# Patient Record
Sex: MALE | Race: WHITE | Employment: OTHER | URBAN - METROPOLITAN AREA
[De-identification: names, ages, dates, MRNs, and addresses within clinical notes are randomized per-mention and may not be internally consistent; named-entity substitution may affect disease eponyms.]

---

## 2017-04-27 ENCOUNTER — HOSPITAL ENCOUNTER (OUTPATIENT)
Dept: PHYSICAL THERAPY | Age: 82
Discharge: HOME | End: 2017-04-27
Payer: MEDICARE

## 2017-04-27 PROCEDURE — G8978 MOBILITY CURRENT STATUS: HCPCS

## 2017-04-27 PROCEDURE — 97162 PT EVAL MOD COMPLEX 30 MIN: CPT

## 2017-04-27 PROCEDURE — G8979 MOBILITY GOAL STATUS: HCPCS

## 2017-04-27 NOTE — PROGRESS NOTES
PHYSICAL THERAPY - DAILY TREATMENT NOTE    Patient Name: Tamera Anthony        Date: 2017  : 1933   YES Patient  Verified  Visit #:     Insurance: Payor: [place] MEDICARE / Plan: [place] MEDICARE PART A & B / Product Type: Medicare /      In time: 11:35 Out time: 12:15   Total Treatment Time: 40     Medicare Time Tracking (below)   Total Timed Codes (min):  - 1:1 Treatment Time:  40     TREATMENT AREA =  Gait    SUBJECTIVE    Pain Level (on 0 to 10 scale):  0  / 10   Medication Changes/New allergies or changes in medical history, any new surgeries or procedures? NO    If yes, update Summary List   Subjective Functional Status/Changes:  []  No changes reported     See eval          OBJECTIVE    Modality rationale:  -    min [] Estim, type:                                          []  att     []  unatt     []  w/US     []  w/ice    []  w/heat    min []  Mechanical Traction: type/lbs                                               []  pro   []  sup   []  int   []  cont    min []  Ultrasound, settings/location:      min []  Iontophoresis:  []  take home patch w/ dexamethazone    min                                []  in clinic w/ dexamethazone    min []  Ice     []  Heat     position:     min []  Other:      - min Therapeutic Exercise:  [x]  See flow sheet   []  Other:      []  Added:     to improve (function):    []  Changed:     to improve (function):       min Therapeutic Activity:      - min Manual Therapy:       min Gait Training:       min Patient Education:  YES  Reviewed HEP   []  Progressed/Changed HEP based on:         Other Objective/Functional Measures:    See eval     Post Treatment Pain Level (on 0 to 10) scale:   0  / 10     ASSESSMENT    []  See Progress Note/Recertification   Patient will continue to benefit from skilled therapy to address remaining functional deficits: see eval   Progress toward goals / Updated goals:    -     PLAN    [x]  Upgrade activities as tolerated YES Continue plan of care   []  Discharge due to :    []  Other:      Therapist: Noah Ballesteros PT    Date: 4/27/2017 Time: 11:31 AM

## 2017-04-27 NOTE — PROGRESS NOTES
[facility] PHYSICAL THERAPY AT [address], [address] [place] [address], [address] [facility], [address] [place] [address] Ln - Phone: [phone]  Fax: [phone] / [address] [facility]  Patient Name: Manny Barker : 1933   Medical   Diagnosis: Other abnormalities of gait and mobility [R26.89] Treatment Diagnosis: Ataxia   Onset Date: chronic     Referral Source: Krissy Wiggins MD Start of Care [facility]): 2017   Prior Hospitalization: See medical history Provider #: [id number]   Prior Level of Function: Chronic decreased gait   Comorbidities: HBP   Medications: Verified on Patient Summary List   The Plan of Care and following information is based on the information from the initial evaluation.   ==============================================================================  Assessment / key information:   Manny Barker is a 80 y.o. male with a chief c/o of recent falls. He reports 2-3 falls this year. He is ambulating without an assistive device. He c/o SOB, which he claims is responsible for his recent falls. He was able to do 15 minutes on the stationary bike, at a high RPM, for 15 minutes before becoming SOB. This makes me worry about side affects of hs meds or positional hypotension as a possible cause of his fainting, not SOB. He scored 43/56 on the Spencer balance test and 17/24 on the Dynamic Gait Index (DGI). Both scores indicate an increased risk of falls. His LE MMT was normal, except for R ankle DF (4+/5), with recent R foot surgery.   The patient would benefit from skilled physical therapy at this time.  ===========================================================================================  Eval Complexity: History MEDIUM  Complexity : 1-2 comorbidities / personal factors will impact the outcome/ POC ;  Examination  HIGH Complexity : 4+ Standardized tests and measures addressing body structure, function, activity limitation and / or participation in recreation ; Presentation MEDIUM Complexity : Evolving with changing characteristics ; Decision Making MEDIUM Complexity : FOTO score of 26-74; Overall Complexity MEDIUM  Problem List: pain affecting function, decrease ROM, decrease strength, impaired gait/ balance, decrease ADL/ functional abilitiies, decrease activity tolerance and decrease flexibility/ joint mobility   Treatment Plan may include any combination of the following: Therapeutic exercise, Therapeutic activities, Neuromuscular re-education, Physical agent/modality, Gait/balance training, Manual therapy and Patient education  Patient / Family readiness to learn indicated by: asking questions, trying to perform skills and interest  Persons(s) to be included in education: patient (P)  Barriers to Learning/Limitations: None  Measures taken:    Patient Goal (s): \"improve balance and walking better\"   Patient self reported health status: good  Rehabilitation Potential: good   Short Term Goals: To be accomplished in  2  weeks:  1. Pt compliant with HEP   Long Term Goals: To be accomplished in  4-5  weeks:  1. Increase Spencer Balance score to > 45/56, to decrease risk of falls  2. Increase DGI score to > 18/24, to decrease risk of falls  3. Increase strength and stability,as indicated by recipricol gait on stairs without the need for handrails  4. Patient Independent with HEP/selfcare  Frequency / Duration:   Patient to be seen 2-3  times per week for 4-8  weeks:  Patient / Caregiver education and instruction: self care, activity modification and exercises  G-Codes (GP): Mobility [id number] Current  CJ= 20-39%   Goal  CI= 1-19%. The severity rating is based on the [address] score  Therapist Signature:  Troy Tate PT, MDT Date: 4/12/2401   Certification Period: 4/27/17-7/23/17 Time: 12:09 PM   ===========================================================================================  I certify that the above Physical Therapy Services are being furnished while the patient is under my care. I agree with the treatment plan and certify that this therapy is necessary. Physician Signature:        Date:       Time:     Please sign and return to [facility] or you may fax the signed copy to [phone]. Thank you.

## 2017-05-01 ENCOUNTER — HOSPITAL ENCOUNTER (OUTPATIENT)
Dept: PHYSICAL THERAPY | Age: 82
Discharge: HOME | End: 2017-05-01
Payer: MEDICARE

## 2017-05-01 PROCEDURE — 97112 NEUROMUSCULAR REEDUCATION: CPT

## 2017-05-01 PROCEDURE — 97110 THERAPEUTIC EXERCISES: CPT

## 2017-05-01 NOTE — PROGRESS NOTES
PHYSICAL THERAPY - DAILY TREATMENT NOTE    Patient Name: Jw Liao        Date: 2017  : 1933   YES Patient  Verified  Visit #:      of   12  Insurance: Payor: Janyefrain Patricia / Plan: VA MEDICARE PART A & B / Product Type: Medicare /      In time: 400 Out time: 440   Total Treatment Time: 40     Medicare Time Tracking (below)   Total Timed Codes (min):  40 1:1 Treatment Time:  40     TREATMENT AREA = Other abnormalities of gait and mobility [R26.89]  Ataxia [R27.0]    SUBJECTIVE  Pain Level (on 0 to 10 scale):  0  / 10   Medication Changes/New allergies or changes in medical history, any new surgeries or procedures? NO    If yes, update Summary List   Subjective Functional Status/Changes:  []  No changes reported     SOB limits my activity        OBJECTIVE    10 min Therapeutic Exercise:  [x]  See flow sheet   Rationale:      increase strength to improve the patients ability to a  Transition with increased ease         30 min Neuromuscular Re-ed: [x]  See flow sheet   Rationale:    improve coordination, improve balance and increase proprioception to improve the patients ability to amb safely         min Gait Training:    Rationale:        throughout Rx min Patient Education:  YES  Reviewed HEP   []  Progressed/Changed HEP based on: Other Objective/Functional Measures:    Initiated HEP. Provided written instruct for HEP     Post Treatment Pain Level (on 0 to 10) scale:   0  / 10     ASSESSMENT  Assessment/Changes in Function:     Functional improvement:  Initiated HEP   Functional limitation:  Fall risk      []  See Progress Note/Recertification   Patient will continue to benefit from skilled PT services to modify and progress therapeutic interventions, address functional mobility deficits, address strength deficits and address imbalance/dizziness to attain remaining goals.    Progress toward goals / Updated goals:    Initiated HEP     PLAN  [x]  Upgrade activities as tolerated YES Continue plan of care   []  Discharge due to :    []  Other:      Therapist: Kori Johnson PT    Date: 5/1/2017 Time: 5:30 PM     Future Appointments  Date Time Provider Steve Harrison   5/4/2017 12:00 PM Otilia Liao, PT REHAB CENTER AT [facility]   5/8/2017 11:30 AM Kori Johnson, PT REHAB [facility]ENTER AT [facility]   5/11/2017 12:00 PM Kori Johnson, PT REHAB CENTER AT [facility]   5/15/2017 11:30 AM Kori Johnson, PT REHAB CENTER AT [facility]   5/17/2017 12:30 PM Otilia Liao, PT REHAB [facility]ENTER AT [facility]   5/22/2017 11:30 AM Kori Johnson, PT REHAB CENTER AT [facility]   5/25/2017 11:00 AM Otilia Liao PT REHAB CENTER AT [facility]

## 2017-05-04 ENCOUNTER — HOSPITAL ENCOUNTER (OUTPATIENT)
Dept: PHYSICAL THERAPY | Age: 82
Discharge: HOME | End: 2017-05-04
Payer: MEDICARE

## 2017-05-04 PROCEDURE — 97110 THERAPEUTIC EXERCISES: CPT

## 2017-05-04 PROCEDURE — 97112 NEUROMUSCULAR REEDUCATION: CPT

## 2017-05-04 NOTE — PROGRESS NOTES
PHYSICAL THERAPY - DAILY TREATMENT NOTE    Patient Name: Sendy Scripture        Date: 2017  : 1933    Patient  Verified: YES  Visit #:   3   of   12  Insurance: Payor: Brice Blanca / Plan: VA MEDICARE PART A & B / Product Type: Medicare /      In time: 12:05 Out time: 12:50   Total Treatment Time: 45     Medicare Time Tracking (below)   Total Timed Codes (min):  45 1:1 Treatment Time:  40     TREATMENT AREA/ DIAGNOSIS = Other abnormalities of gait and mobility [R26.89]  Ataxia [R27.0]    SUBJECTIVE  Pain Level (on 0 to 10 scale):  0  / 10   Medication Changes/New allergies or changes in medical history, any new surgeries or procedures? NO    If yes, update Summary List   Subjective Functional Status/Changes:  [x]  No changes reported     Functional improvement    Functional limitation       15 min Neuromuscular Re-ed:    Rationale:    improve coordination, improve balance and increase proprioception to improve the patients ability to ambulate safely      35 min Therapeutic Exercise:  [x]  See flow sheet   Rationale:      increase ROM and increase strength to improve the patients ability to ambulate safely          min Patient Education:  Yes    [x] Reviewed HEP   []  Progressed/Changed HEP based on: Other Objective/Functional Measures:    Continuing work on balance and LE strength    Post Treatment Pain Level (on 0 to 10) scale:   0  / 10     ASSESSMENT  Assessment/Changes in Function:     Pt with good work ethic and participation with evidence of HEP compliance      []  See Progress Note/Recertification   Patient will continue to benefit from skilled PT services to modify and progress therapeutic interventions, address functional mobility deficits, address ROM deficits, address strength deficits, analyze and address soft tissue restrictions, analyze and cue movement patterns and analyze and modify body mechanics/ergonomics to attain remaining goals.    Progress toward goals / Updated goals:     NA     PLAN  [x]  Upgrade activities as tolerated YES Continue plan of care   []  Discharge due to :    []  Other:      Therapist: Cris Dejesus PT    Date: 5/4/2017 Time: 2:46 PM        Future Appointments  Date Time Provider Steve Harrison   5/8/2017 11:30 AM Stefani Clark, PT REHAB CENTER AT [facility]   5/11/2017 12:00 PM Stefani Clark, PT REHAB CENTER AT [facility]   5/15/2017 11:30 AM Stefani Clark, PT REHAB CENTER AT [facility]   5/17/2017 12:30 PM Cris Dejesus, PT REHAB CENTER AT [facility]   5/22/2017 11:30 AM Stefani Clark, PT REHAB CENTER AT [facility]   5/25/2017 11:00 AM Cris Dejesus, PT REHAB CENTER AT [facility]

## 2017-05-08 ENCOUNTER — HOSPITAL ENCOUNTER (OUTPATIENT)
Dept: PHYSICAL THERAPY | Age: 82
Discharge: HOME | End: 2017-05-08
Payer: MEDICARE

## 2017-05-08 PROCEDURE — 97112 NEUROMUSCULAR REEDUCATION: CPT

## 2017-05-08 PROCEDURE — 97110 THERAPEUTIC EXERCISES: CPT

## 2017-05-08 NOTE — PROGRESS NOTES
PHYSICAL THERAPY - DAILY TREATMENT NOTE    Patient Name: Guillaume Fernandez        Date: 2017  : 1933   YES Patient  Verified  Visit #:      12  Insurance: Payor: Song Keating / Plan: VA MEDICARE PART A & B / Product Type: Medicare /      In time: 1130 Out time: 1210   Total Treatment Time: 40     Medicare Time Tracking (below)   Total Timed Codes (min):  40 1:1 Treatment Time:  30     TREATMENT AREA = Other abnormalities of gait and mobility [R26.89]  Ataxia [R27.0]    SUBJECTIVE  Pain Level (on 0 to 10 scale):  0  / 10   Medication Changes/New allergies or changes in medical history, any new surgeries or procedures? NO    If yes, update Summary List   Subjective Functional Status/Changes:  []  No changes reported     Reports not working on ex except for the sitting ones        OBJECTIVE    20, 10 1:1 min Therapeutic Exercise:  [x]  See flow sheet   Rationale:      increase strength, improve coordination and improve balance to improve the patients ability to amb with improved safety         20 min Neuromuscular Re-ed: [x]  See flow sheet   Rationale:    improve coordination, improve balance and increase proprioception to improve the patients ability to amb with inc safety         min Gait Training:    Rationale:        throughout Rx min Patient Education:  YES  Reviewed HEP   []  Progressed/Changed HEP based on: Other Objective/Functional Measures:    Pt ed on importance of HEP to make gains. Re- issued written instruct for balance activity     Post Treatment Pain Level (on 0 to 10) scale:   0  / 10     ASSESSMENT  Assessment/Changes in Function:     Min change in function     []  See Progress Note/Recertification   Patient will continue to benefit from skilled PT services to modify and progress therapeutic interventions, address functional mobility deficits, address strength deficits and address imbalance/dizziness to attain remaining goals.    Progress toward goals / Updated goals:    Pt education on HEP compliance     PLAN  []  Upgrade activities as tolerated YES Continue plan of care   []  Discharge due to :    []  Other:      Therapist: Malini Corona PT    Date: 5/8/2017 Time: 11:30 AM     Future Appointments  Date Time Provider Steve Harrison   5/11/2017 12:00 PM Malini Corona PT REHAB CENTER AT [facility]   5/15/2017 11:30 AM Malini Corona PT REHAB CENTER AT [facility]   5/17/2017 12:30 PM Nisa Cook PT REHAB CENTER AT [facility]   5/22/2017 11:30 AM Malini Corona PT REHAB CENTER AT [facility]   5/25/2017 11:00 AM Nisa Cook, PT REHAB CENTER AT [facility]

## 2017-05-11 ENCOUNTER — HOSPITAL ENCOUNTER (OUTPATIENT)
Dept: PHYSICAL THERAPY | Age: 82
Discharge: HOME | End: 2017-05-11
Payer: MEDICARE

## 2017-05-11 PROCEDURE — 97110 THERAPEUTIC EXERCISES: CPT

## 2017-05-11 PROCEDURE — 97112 NEUROMUSCULAR REEDUCATION: CPT

## 2017-05-11 NOTE — PROGRESS NOTES
PHYSICAL THERAPY - DAILY TREATMENT NOTE    Patient Name: Ashvin Jerez        Date: 2017  : 1933   YES Patient  Verified  Visit #:     Insurance: Payor: Abhilash Walker / Plan: VA MEDICARE PART A & B / Product Type: Medicare /      In time: 9708 Out time: 1240   Total Treatment Time: 35     Medicare Time Tracking (below)   Total Timed Codes (min):  35 1:1 Treatment Time:  25     TREATMENT AREA = Other abnormalities of gait and mobility [R26.89]  Ataxia [R27.0]    SUBJECTIVE  Pain Level (on 0 to 10 scale):  0  / 10   Medication Changes/New allergies or changes in medical history, any new surgeries or procedures? NO    If yes, update Summary List   Subjective Functional Status/Changes:  []  No changes reported     Working on bike at home. My walking problems come from weakness        OBJECTIVE    15, 5 1:1 min Therapeutic Exercise:  [x]  See flow sheet   Rationale:      increase strength to improve the patients ability to amb safely/ transfer         20, min Neuromuscular Re-ed: [x]  See flow sheet   Rationale:    improve coordination and improve balance to improve the patients ability to amb safely        throughout Rx min Patient Education:  YES  Reviewed HEP   []  Progressed/Changed HEP based on: Other Objective/Functional Measures:    Requires several rest periods due to SOB     Post Treatment Pain Level (on 0 to 10) scale:   0  / 10     ASSESSMENT  Assessment/Changes in Function:     Requires close supervision for dynamic gait      []  See Progress Note/Recertification   Patient will continue to benefit from skilled PT services to modify and progress therapeutic interventions, address functional mobility deficits, address strength deficits and address imbalance/dizziness to attain remaining goals.    Progress toward goals / Updated goals:    Slow progress     PLAN  [x]  Upgrade activities as tolerated YES Continue plan of care   []  Discharge due to :    []  Other: Therapist: Anastacia Jones PT    Date: 5/11/2017 Time: 12:48 PM     Future Appointments  Date Time Provider Steve Harrison   5/15/2017 11:30 AM Anastacia Jones PT REHAB CENTER AT [facility]   5/17/2017 10:00 AM Emilee Ha, PT REHAB CENTER AT [facility]   5/22/2017 11:30 AM Anastacia Jones PT REHAB CENTER AT [facility]   5/25/2017 11:00 AM Emilee Ha, PT REHAB CENTER AT [facility]

## 2017-05-15 ENCOUNTER — HOSPITAL ENCOUNTER (OUTPATIENT)
Dept: PHYSICAL THERAPY | Age: 82
Discharge: HOME | End: 2017-05-15
Payer: MEDICARE

## 2017-05-15 PROCEDURE — 97110 THERAPEUTIC EXERCISES: CPT

## 2017-05-15 PROCEDURE — 97112 NEUROMUSCULAR REEDUCATION: CPT

## 2017-05-15 NOTE — PROGRESS NOTES
PHYSICAL THERAPY - DAILY TREATMENT NOTE    Patient Name: Martha Diego        Date: 5/15/2017  : 1933   YES Patient  Verified  Visit #:     Insurance: Payor: Flavio Pritchett / Plan: VA MEDICARE PART A & B / Product Type: Medicare /      In time: 1130 Out time: 1210   Total Treatment Time: 40     Medicare Time Tracking (below)   Total Timed Codes (min):  40 1:1 Treatment Time:  30     TREATMENT AREA = Other abnormalities of gait and mobility [R26.89]  Ataxia [R27.0]    SUBJECTIVE  Pain Level (on 0 to 10 scale):  0  / 10   Medication Changes/New allergies or changes in medical history, any new surgeries or procedures? NO    If yes, update Summary List   Subjective Functional Status/Changes:  []  No changes reported     I have always had balance issues. Not sure if they can be fixed        OBJECTIVE    20, 10 1:1 min Therapeutic Exercise:  [x]  See flow sheet   Rationale:      increase strength to improve the patients ability to amb with improved safety         20 min Neuromuscular Re-ed: [x]  See flow sheet   Rationale:    improve coordination, improve balance and increase proprioception to improve the patients ability to amb safely  essment post-treatment (if applicable):    [x]  intact    []  redness- no adverse reaction     []redness  adverse reaction:          throughout Rx min Patient Education:  YES  Reviewed HEP   []  Progressed/Changed HEP based on: Other Objective/Functional Measures:    Requires several rest periods. Fatiiges easily.   Can move impulsively, [place] when tired     Post Treatment Pain Level (on 0 to 10) scale:   0  / 10     ASSESSMENT  Assessment/Changes in Function:       Functional limitation:  At risk for falls      []  See Progress Note/Recertification   Patient will continue to benefit from skilled PT services to modify and progress therapeutic interventions, address functional mobility deficits, address strength deficits and address imbalance/dizziness to attain remaining goals.    Progress toward goals / Updated goals:    Slow but steady progress     PLAN  [x]  Upgrade activities as tolerated YES Continue plan of care   []  Discharge due to :    []  Other:      Therapist: Compa Coughlin PT    Date: 5/15/2017 Time: 1:13 PM     Future Appointments  Date Time Provider Steve Harrison   5/17/2017 10:00 AM Carmen Golden PT REHAB CENTER AT [facility]   5/22/2017 11:30 AM Compa Coughlin PT REHAB CENTER AT [facility]   5/25/2017 11:00 AM Carmen Golden PT REHAB CENTER AT [facility]

## 2017-05-17 ENCOUNTER — APPOINTMENT (OUTPATIENT)
Dept: PHYSICAL THERAPY | Age: 82
End: 2017-05-17
Payer: MEDICARE

## 2017-05-18 ENCOUNTER — APPOINTMENT (OUTPATIENT)
Dept: PHYSICAL THERAPY | Age: 82
End: 2017-05-18
Payer: MEDICARE

## 2017-05-22 ENCOUNTER — HOSPITAL ENCOUNTER (OUTPATIENT)
Dept: PHYSICAL THERAPY | Age: 82
Discharge: HOME | End: 2017-05-22
Payer: MEDICARE

## 2017-05-22 PROCEDURE — 97112 NEUROMUSCULAR REEDUCATION: CPT

## 2017-05-22 PROCEDURE — 97110 THERAPEUTIC EXERCISES: CPT

## 2017-05-22 NOTE — PROGRESS NOTES
PHYSICAL THERAPY - DAILY TREATMENT NOTE    Patient Name: Jw Liao        Date: 2017  : 1933   YES Patient  Verified  Visit #:     Insurance: Payor: Jeffrey Gomez / Plan: VA MEDICARE PART A & B / Product Type: Medicare /      In time: 1130 Out time: 1225   Total Treatment Time: 55     Medicare Time Tracking (below)   Total Timed Codes (min):  55 1:1 Treatment Time:  40     TREATMENT AREA = Other abnormalities of gait and mobility [R26.89]  Ataxia [R27.0]    SUBJECTIVE  Pain Level (on 0 to 10 scale):  0  / 10   Medication Changes/New allergies or changes in medical history, any new surgeries or procedures? NO    If yes, update Summary List   Subjective Functional Status/Changes:  []  No changes reported     Legs not cooperating today   Overall- 50% better  Working on stationary bike at home. OBJECTIVE    25 min Therapeutic Exercise:  [x]  See flow sheet   Rationale:      increase strength to improve the patients ability to perform ADLs with inc ease         15 1:1, 25 min Neuromuscular Re-ed: [x]  See flow sheet   Rationale:    increase strength, improve coordination, improve balance and increase proprioception to improve the patients ability to amb safety        throughout Rx min Patient Education:  YES  Reviewed HEP   []  Progressed/Changed HEP based on: Other Objective/Functional Measures:    Reports better compliance with there ex/ HEP, but then reports not doing strengthening ex on a regular basis     Post Treatment Pain Level (on 0 to 10) scale:   0  / 10     ASSESSMENT  Assessment/Changes in Function:     Functional improvement:  Easier to get up from the chair. Walking is getting better--more secure   Functional limitation:  Limited amb duration/ distance by leg strength and SOB. [place] walk on the beach.   Won't go to festivals due to uneven ground and amount of walking it requires      []  See Progress Note/Recertification   Patient will continue to benefit from skilled PT services to modify and progress therapeutic interventions, address functional mobility deficits, address ROM deficits, address strength deficits, address imbalance/dizziness and instruct in home and community integration to attain remaining goals.    Progress toward goals / Updated goals:    Slow progress     PLAN  [x]  Upgrade activities as tolerated YES Continue plan of care   []  Discharge due to :    [x]  Other: Re-assess next visit     Therapist: Janell Jones PT    Date: 5/22/2017 Time: 11:52 AM     Future Appointments  Date Time Provider Steve Harrison   5/25/2017 11:00 AM Nathanael Fuller, PT REHAB CENTER AT [facility]

## 2017-05-25 ENCOUNTER — HOSPITAL ENCOUNTER (OUTPATIENT)
Dept: PHYSICAL THERAPY | Age: 82
Discharge: HOME | End: 2017-05-25
Payer: MEDICARE

## 2017-05-25 PROCEDURE — G8980 MOBILITY D/C STATUS: HCPCS

## 2017-05-25 PROCEDURE — 97110 THERAPEUTIC EXERCISES: CPT

## 2017-05-25 PROCEDURE — 97112 NEUROMUSCULAR REEDUCATION: CPT

## 2017-05-25 PROCEDURE — G8979 MOBILITY GOAL STATUS: HCPCS

## 2017-05-25 PROCEDURE — 97530 THERAPEUTIC ACTIVITIES: CPT

## 2017-05-25 NOTE — PROGRESS NOTES
[facility] PHYSICAL THERAPY AT [facility] 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 1. Increase Spencer Balance score to > 45/56, to indicate decreased risk of falls    Status at last Eval: 43 Current Status: 48/56 yes   2. Increase DGI score to > 18/24 to indicate decreased risk of falls    Status at last Eval:  Current Status:  yes   3. Increase LE strength to allow recipricol gait on stairs without handrails   Status at last Eval: recipricol gait on stairs with need of handrails Current Status: recipricol gait on stairs with need of handrails no     Key Functional Changes/Progress: learning HEP. PT reports he is 30-40% improved. Problem List: pain affecting function, decrease ROM, decrease strength, edema affecting function, impaired gait/ balance, decrease ADL/ functional abilitiies, decrease activity tolerance and decrease flexibility/ joint mobility   Treatment Plan may include any combination of the following: Therapeutic exercise, Therapeutic activities, Neuromuscular re-education, Physical agent/modality, Gait/balance training, Manual therapy and Patient education  Patient Goal(s) has been updated and includes:  Get stronger    Goals for this certification period include and are to be achieved in   4  weeks:  1.   Increase Spencer Balance score to 56/56, to indicate decreased risk of falls  2. Increase strength and balance to allow recipricol gait on stairs without need for handrails  3. Increase B SLS to > 10 sec to indicate increased balalnce  Frequency / Duration:   Patient to be seen   2   times per week for   4    weeks:  G-Codes (GP): Mobility:   Goal  CI= 1-19%  D/C  CI= 1-19%. The severity rating is based on the Other Spencer Balance score  Assessments/Recommendations: Continue PT 2x/week x 4-5 weeks  If you have any questions/comments please contact us directly at [phone]. Thank you for allowing us to assist in the care of your patient. Therapist Signature: Elvin Champagne PT, MDT Date: 0/19/5339   Certification Period:  Reporting Period: 4/27/17-7/23/17 4/27/17-5/25/17 Time: 11:58 AM   NOTE TO PHYSICIAN:  PLEASE COMPLETE THE ORDERS BELOW AND FAX TO   [facility] at [place]: [phone]. If you are unable to process this request in 24 hours please contact our office: [phone].    ___ I have read the above report and request that my patient continue as recommended.   ___ I have read the above report and request that my patient continue therapy with the following changes/special instructions: ________________________________________________   ___ I have read the above report and request that my patient be discharged from therapy.      Physician Signature:        Date:       Time:

## 2017-05-25 NOTE — PROGRESS NOTES
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 NO    If yes, update Summary List   Subjective Functional Status/Changes:  [x]  No changes reported     Functional improvement    Functional limitation       15 min Neuromuscular Re-ed:    Rationale:    improve coordination, improve balance and increase proprioception to improve the patients ability to ambulate safely    15 min Therapeutic Activity: SIERRA and DGI reassessement   Rationale:    assessment to improve the patients ability to     20 min Therapeutic Exercise:  [x]  See flow sheet   Rationale:      increase ROM and increase strength to improve the patients ability to ambulate safely          min Patient Education:  Yes    [x] Reviewed HEP   []  Progressed/Changed HEP based on:         Other Objective/Functional Measures:    See 701/recert    Post Treatment Pain Level (on 0 to 10) scale:   0  / 10     ASSESSMENT  Assessment/Changes in Function:     701/recert     []  See Progress Note/Recertification   Patient will continue to benefit from skilled PT services to modify and progress therapeutic interventions, address functional mobility deficits, address ROM deficits, address strength deficits, analyze and address soft tissue restrictions, analyze and cue movement patterns, analyze and modify body mechanics/ergonomics and assess and modify postural abnormalities to attain remaining goals.    Progress toward goals / Updated goals:    See 701/frecert     PLAN  [x]  Upgrade activities as tolerated YES Continue plan of care   []  Discharge due to :    []  Other:      Therapist: Mel Rodas PT    Date: 5/25/2017 Time: 11:54 AM        Future Appointments  Date Time Provider Steve Harrison   5/31/2017 11:00 AM Mel Rodas PT REHAB CENTER AT [facility]   6/2/2017 2:00 PM Mel Rodas PT REHAB CENTER AT [facility]   6/5/2017 10:00 AM Lior Tomlinson, PT REHAB CENTER AT [facility]   6/8/2017 12:30 PM Mel Rodas PT REHAB CENTER AT [facility]   6/13/2017 11:00 AM Mel Rodas PT REHAB CENTER AT [facility]   6/15/2017 11:00 AM Mel Rodas PT REHAB CENTER AT [facility]   6/19/2017 11:00 AM Lior Tomlinson, PT REHAB CENTER AT [facility]   6/23/2017 11:00 AM Mel Rodas PT REHAB CENTER AT [facility]   6/26/2017 11:30 AM Lior Tomlinson, PT REHAB CENTER AT [facility]   6/29/2017 12:30 PM Mel Rodas PT REHAB CENTER AT [facility]

## 2017-05-31 ENCOUNTER — HOSPITAL ENCOUNTER (OUTPATIENT)
Dept: PHYSICAL THERAPY | Age: 82
End: 2017-05-31
Payer: MEDICARE

## 2017-06-02 ENCOUNTER — HOSPITAL ENCOUNTER (OUTPATIENT)
Dept: PHYSICAL THERAPY | Age: 82
Discharge: HOME | End: 2017-06-02
Payer: MEDICARE

## 2017-06-02 PROCEDURE — G8978 MOBILITY CURRENT STATUS: HCPCS

## 2017-06-02 PROCEDURE — 97110 THERAPEUTIC EXERCISES: CPT

## 2017-06-02 PROCEDURE — 97112 NEUROMUSCULAR REEDUCATION: CPT

## 2017-06-02 PROCEDURE — G8979 MOBILITY GOAL STATUS: HCPCS

## 2017-06-02 PROCEDURE — 97140 MANUAL THERAPY 1/> REGIONS: CPT

## 2017-06-02 NOTE — PROGRESS NOTES
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 NO    If yes, update Summary List   Subjective Functional Status/Changes:  [x]  No changes reported     Functional improvement    Functional limitation       15 min Neuromuscular Re-ed:    Rationale:    improve coordination, improve balance and increase proprioception to improve the patients ability to ambulate safely    25 min Therapeutic Exercise:  [x]  See flow sheet   Rationale:      increase ROM and increase strength to improve the patients ability to ambulate safely          min Patient Education:  Yes    [x] Reviewed HEP   []  Progressed/Changed HEP based on: Other Objective/Functional Measures:    Pt doing better with all balance activities   Post Treatment Pain Level (on 0 to 10) scale:   0  / 10     ASSESSMENT  Assessment/Changes in Function:     Pt responding well to POC     []  See Progress Note/Recertification   Patient will continue to benefit from skilled PT services to modify and progress therapeutic interventions, address functional mobility deficits, address ROM deficits, address strength deficits, analyze and address soft tissue restrictions, analyze and cue movement patterns, analyze and modify body mechanics/ergonomics and assess and modify postural abnormalities to attain remaining goals.    Progress toward goals / Updated goals:    Improved strength and balance     PLAN  [x]  Upgrade activities as tolerated YES Continue plan of care   []  Discharge due to :    []  Other:      Therapist: Maria C Vasques PT    Date: 6/2/2017 Time: 3:10 PM        Future Appointments  Date Time Provider Steve Harrison   6/5/2017 10:00 AM Carlotta Burton, PT REHAB CENTER AT [facility]   6/8/2017 12:30 PM Maria C Vasques, PT REHAB CENTER AT [facility]   6/13/2017 11:00 AM Maria C Vasques, PT REHAB CENTER AT [facility]   6/15/2017 11:00 AM Maria C Vasques, PT RE[facility]ENTER AT [facility]   6/19/2017 11:00 AM Carlotta Burton, PT REHAB [facility]ENTER AT [facility]   6/23/2017 11:00 AM Maria C Vasques, PT REHAB [facility]ENTER AT [facility]   6/26/2017 11:30 AM Carlotta Burton PT REHAB CENTER AT [facility]   6/29/2017 12:30 PM Maria C Vasques, PT REHAB CENTER AT [facility]

## 2017-06-05 ENCOUNTER — HOSPITAL ENCOUNTER (OUTPATIENT)
Dept: PHYSICAL THERAPY | Age: 82
Discharge: HOME | End: 2017-06-05
Payer: MEDICARE

## 2017-06-05 PROCEDURE — 97112 NEUROMUSCULAR REEDUCATION: CPT

## 2017-06-05 PROCEDURE — 97110 THERAPEUTIC EXERCISES: CPT

## 2017-06-05 NOTE — PROGRESS NOTES
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 NO    If yes, update Summary List   Subjective Functional Status/Changes:  []  No changes reported     Foot was bothering me over the weekend, but seems better today. OBJECTIVE    15 1:1, 30 min Therapeutic Exercise:  [x]  See flow sheet   Rationale:      increase ROM and increase strength to improve the patients ability to amb safely         201:1 min Neuro re-ed: X See work sheet   Rationale:    improve coordination, improve balance and increase proprioception to improve the patients ability to amb safely            throughout Rx min Patient Education:  YES  Reviewed HEP   []  Progressed/Changed HEP based on: Other Objective/Functional Measures:    Reports doing ex 2-3x/week     Post Treatment Pain Level (on 0 to 10) scale:   0  / 10     ASSESSMENT  Assessment/Changes in Function:     Functional improvement:     Functional limitation:  I know my limitations--I don't go dancing. I don't go to listen to bands due to walking. I get dropped off at the door so I don't have to deal with parking lots.         []  See Progress Note/Recertification   Patient will continue to benefit from skilled PT services to modify and progress therapeutic interventions, address functional mobility deficits, address ROM deficits, address strength deficits, address imbalance/dizziness and instruct in home and community integration to attain remaining goals.    Progress toward goals / Updated goals:    Slow progress     PLAN  []  Upgrade activities as tolerated YES Continue plan of care   []  Discharge due to :    []  Other:      Therapist: Emperatriz Howe PT    Date: 6/5/2017 Time: 10:11 AM     Future Appointments  Date Time Provider Steve Harrison   6/8/2017 12:30 PM Angle Garber PT REHAB CENTER AT [facility]   6/13/2017 11:00 AM Angle Garber, PT REHAB CENTER AT [facility]   6/15/2017 11:00 AM Angle Garber PT REHAB CENTER AT [facility]   6/19/2017 11:00 AM Emperatriz Howe PT REHAB CENTER AT [facility]   6/23/2017 11:00 AM Angle Garber PT REHAB CENTER AT [facility]   6/26/2017 11:30 AM Emperatriz Howe PT REHAB CENTER AT [facility]   6/29/2017 12:30 PM Angle Garber, PT REHAB CENTER AT [facility]

## 2017-06-08 ENCOUNTER — APPOINTMENT (OUTPATIENT)
Dept: PHYSICAL THERAPY | Age: 82
End: 2017-06-08
Payer: MEDICARE

## 2017-06-09 ENCOUNTER — APPOINTMENT (OUTPATIENT)
Dept: PHYSICAL THERAPY | Age: 82
End: 2017-06-09
Payer: MEDICARE

## 2017-06-13 ENCOUNTER — HOSPITAL ENCOUNTER (OUTPATIENT)
Dept: PHYSICAL THERAPY | Age: 82
Discharge: HOME | End: 2017-06-13
Payer: MEDICARE

## 2017-06-13 PROCEDURE — 97110 THERAPEUTIC EXERCISES: CPT

## 2017-06-13 PROCEDURE — 97112 NEUROMUSCULAR REEDUCATION: CPT

## 2017-06-13 NOTE — PROGRESS NOTES
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 NO    If yes, update Summary List   Subjective Functional Status/Changes:  [x]  No changes reported     Functional improvement    Functional limitation       20 min Neuromuscular Re-ed:    Rationale:    improve coordination, improve balance and increase proprioception to improve the patients ability to .ambulate safely    40 min Therapeutic Exercise:  [x]  See flow sheet   Rationale:      increase ROM and increase strength to improve the patients ability to ambulate safely          min Patient Education:  Yes    [x] Reviewed HEP   []  Progressed/Changed HEP based on: Other Objective/Functional Measures:    Pt with improving balance activities   Post Treatment Pain Level (on 0 to 10) scale:   0  / 10     ASSESSMENT  Assessment/Changes in Function:     No pain  Not needing assistive device for ambulation     []  See Progress Note/Recertification   Patient will continue to benefit from skilled PT services to modify and progress therapeutic interventions, address functional mobility deficits, address ROM deficits, address strength deficits, analyze and address soft tissue restrictions, analyze and cue movement patterns, analyze and modify body mechanics/ergonomics and assess and modify postural abnormalities to attain remaining goals.    Progress toward goals / Updated goals:    Improved strength and balance      PLAN  [x]  Upgrade activities as tolerated YES Continue plan of care   []  Discharge due to :    []  Other:      Therapist: Agnes He PT    Date: 6/13/2017 Time: 12:48 PM        Future Appointments  Date Time Provider Steve Harrison   6/15/2017 11:00 AM Agnes He PT REHAB CENTER AT [facility]   6/19/2017 11:00 AM Nathaniel Nuno PT REHAB CENTER AT [facility]   6/23/2017 11:00 AM Agnes He PT REHAB CENTER AT [facility]   6/26/2017 11:30 AM Nathaniel Nuno PT REHAB CENTER AT [facility]   6/29/2017 12:30 PM Agnes He PT REHAB CENTER AT [facility]

## 2017-06-19 ENCOUNTER — HOSPITAL ENCOUNTER (OUTPATIENT)
Dept: PHYSICAL THERAPY | Age: 82
Discharge: HOME | End: 2017-06-19
Payer: MEDICARE

## 2017-06-19 PROCEDURE — 97110 THERAPEUTIC EXERCISES: CPT

## 2017-06-19 PROCEDURE — 97112 NEUROMUSCULAR REEDUCATION: CPT

## 2017-06-19 NOTE — PROGRESS NOTES
PHYSICAL THERAPY - DAILY TREATMENT NOTE    Patient Name: Martha Diego        Date: 2017  : 1933   YES Patient  Verified  Visit #:     Insurance: Payor: Flavio Pritchett / Plan: [place] MEDICARE PART A & B / Product Type: Medicare /      In time: 1110 Out time: 1145   Total Treatment Time: 35     Medicare Time Tracking (below)   Total Timed Codes (min):  35 1:1 Treatment Time:  25     TREATMENT AREA = Other abnormalities of gait and mobility [R26.89]  Ataxia [R27.0]    SUBJECTIVE  Pain Level (on 0 to 10 scale):  0  / 10   Medication Changes/New allergies or changes in medical history, any new surgeries or procedures? NO    If yes, update Summary List   Subjective Functional Status/Changes:  []  No changes reported     Hospitalized 2 days--returning to PT for continued care. Has done minimal exercise since last visit       Denies dizziness, weakness      OBJECTIVE    10 min Therapeutic Exercise:  [x]  See flow sheet   Rationale:      increase ROM and increase strength to improve the patients ability to amb/ transition with less pain         25, 15 1:1 min Neuromuscular Re-ed: [x]  See flow sheet   Rationale:    improve coordination, improve balance and increase proprioception to improve the patients ability to amb safely        throughout Rx min Patient Education:  YES  Reviewed HEP   []  Progressed/Changed HEP based on: Other Objective/Functional Measures:    Requires freq rest periods due to SOB  Requires cueing to perform with proper technuque     Post Treatment Pain Level (on 0 to 10) scale:   0  / 10     ASSESSMENT  Assessment/Changes in Function:     Reaching plateau     []  See Progress Note/Recertification   Patient will continue to benefit from skilled PT services to modify and progress therapeutic interventions, address functional mobility deficits and address imbalance/dizziness to attain remaining goals.    Progress toward goals / Updated goals:    Reaching plateau PLAN  [x]  Upgrade activities as tolerated YES Continue plan of care   []  Discharge due to :    []  Other:      Therapist: Silverio Ross PT    Date: 6/19/2017 Time: 11:14 AM     Future Appointments  Date Time Provider Steve Harirson   6/23/2017 11:00 AM Dixie Angulo PT REHAB CENTER AT [facility]   6/26/2017 11:30 AM Silverio Ross PT REHAB CENTER AT [facility]   6/29/2017 12:30 PM Dixie Angulo PT REHAB CENTER AT [facility]

## 2017-06-23 ENCOUNTER — HOSPITAL ENCOUNTER (OUTPATIENT)
Dept: PHYSICAL THERAPY | Age: 82
Discharge: HOME | End: 2017-06-23
Payer: MEDICARE

## 2017-06-23 PROCEDURE — 97530 THERAPEUTIC ACTIVITIES: CPT

## 2017-06-23 PROCEDURE — 97110 THERAPEUTIC EXERCISES: CPT

## 2017-06-23 PROCEDURE — G8978 MOBILITY CURRENT STATUS: HCPCS

## 2017-06-23 PROCEDURE — 97112 NEUROMUSCULAR REEDUCATION: CPT

## 2017-06-23 PROCEDURE — G8979 MOBILITY GOAL STATUS: HCPCS

## 2017-06-23 NOTE — PROGRESS NOTES
[address] PHYSICAL THERAPY AT [facility] [address] 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 1. Increase Spencer Balance score to56/56, to indicate decreased risk of falls    Status at last Eval: 48/56 (was 43/56) Current Status: 49/56 progressing   2. Increase B SLS to > 10 sec to decrease risk of falls    Status at last Eval: < 3 sec L and R Current Status: < 3 sec L and R yes   3. Increase LE strength to allow recipricol gait on stairs without handrails   Status at last Eval: recipricol gait on stairs with need of handrails Current Status: Able to negotiate stairs with recipricol gait without rails, but needing CS progressing     Key Functional Changes/Progress: learning HEP. PT reports he is 30-40% improved.   Problem List: pain affecting function, decrease ROM, decrease strength, edema affecting function, impaired gait/ balance, decrease ADL/ functional abilitiies, decrease activity tolerance and decrease flexibility/ joint mobility   Treatment Plan may include any combination of the following: Therapeutic exercise, Therapeutic activities, Neuromuscular re-education, Physical agent/modality, Gait/balance training, Manual therapy and Patient education  Patient Goal(s) has been updated and includes:  Get stronger    Goals for this certification period include and are to be achieved in 4  weeks:  1. Increase Spencer Balance score to 56/56, to indicate decreased risk of falls  2. Increase strength and balance to allow recipricol gait on stairs, safely,  without need for handrails  3. Pt able to  rhomberg position with eyes closed for > 30 sec, to decrease risk of falls  Frequency / Duration:   Patient to be seen   2   times per week for   4    weeks:  G-Codes (GP): Carry: [id number] Current  CI= 1-19%    Goal  CH= 0%. The severity rating is based on the Other Spencer Balance score  Assessments/Recommendations: Continue PT 2x/week x 4-5 weeks  If you have any questions/comments please contact us directly at [phone]. Thank you for allowing us to assist in the care of your patient. Therapist Signature: Lakia Lovell PT, MDT Date: 6/45/0305   Certification Period:  Reporting Period: 4/27/17-7/23/17 5/25/17-6/23/17 Time: 11:58 AM   NOTE TO PHYSICIAN:  PLEASE COMPLETE THE ORDERS BELOW AND FAX TO   [facility] at [place]: [phone]. If you are unable to process this request in 24 hours please contact our office: [phone].    ___ I have read the above report and request that my patient continue as recommended.   ___ I have read the above report and request that my patient continue therapy with the following changes/special instructions: ________________________________________________   ___ I have read the above report and request that my patient be discharged from therapy.      Physician Signature:        Date:       Time:

## 2017-06-23 NOTE — PROGRESS NOTES
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 NO    If yes, update Summary List   Subjective Functional Status/Changes:  [x]  No changes reported     Functional improvement    Functional limitation       15 min Neuromuscular Re-ed:    Rationale:    improve coordination, improve balance and increase proprioception to improve the patients ability to ambulate safely    10 min Therapeutic Activity: Spencer balance test and reassessment   Rationale:    reasses   25 min Therapeutic Exercise:  [x]  See flow sheet   Rationale:      increase ROM and increase strength to improve the patients ability to ambulate safely          min Patient Education:  Yes    [x] Reviewed HEP   []  Progressed/Changed HEP based on:         Other Objective/Functional Measures:    See 701/recert   Post Treatment Pain Level (on 0 to 10) scale:   0  / 10     ASSESSMENT  Assessment/Changes in Function:     See 701/recert     []  See Progress Note/Recertification   Patient will continue to benefit from skilled PT services to modify and progress therapeutic interventions, address functional mobility deficits, address ROM deficits, address strength deficits, analyze and address soft tissue restrictions, analyze and cue movement patterns, analyze and modify body mechanics/ergonomics and assess and modify postural abnormalities to attain remaining goals.   Progress toward goals / Updated goals:    See 701/recert     PLAN  [x]  Upgrade activities as tolerated YES Continue plan of care   []  Discharge due to :    []  Other:      Therapist: Troy Tate PT    Date: 6/23/2017 Time: 11:39 AM        Future Appointments  Date Time Provider Steve Harrison   6/26/2017 11:30 AM Tomy Salomon, PT REHAB CENTER AT [facility]   6/29/2017 12:30 PM Troy Tate, PT REHAB CENTER AT [facility]

## 2017-06-26 ENCOUNTER — HOSPITAL ENCOUNTER (OUTPATIENT)
Dept: PHYSICAL THERAPY | Age: 82
Discharge: HOME | End: 2017-06-26
Payer: MEDICARE

## 2017-06-26 PROCEDURE — 97112 NEUROMUSCULAR REEDUCATION: CPT

## 2017-06-26 NOTE — PROGRESS NOTES
PHYSICAL THERAPY - DAILY TREATMENT NOTE    Patient Name: David Delgadillo        Date: 2017  : 1933   YES Patient  Verified  Visit #:     Insurance: Payor: Romana Estimable / Plan: [place] / Product Type: Medicare /      In time: 1130 Out time: 1230   Total Treatment Time: 60     Medicare Time Tracking (below)   Total Timed Codes (min):  60 1:1 Treatment Time:  30     TREATMENT AREA = Other abnormalities of gait and mobility [R26.89]  Ataxia [R27.0]    SUBJECTIVE  Pain Level (on 0 to 10 scale):  0  / 10   Medication Changes/New allergies or changes in medical history, any new surgeries or procedures? NO    If yes, update Summary List   Subjective Functional Status/Changes:  []  No changes reported     It's a Monday. Didn't sleep well due to apnea. OBJECTIVE    30, nc min Therapeutic Exercise:  [x]  See flow sheet   Rationale:      increase strength and improve coordination to improve the patients ability to amb safely         30 min Neuromuscular Re-ed: [x]  See flow sheet   Rationale:    improve coordination and improve balance to improve the patients ability to amb safely        throughout Rx min Patient Education:  YES  Reviewed HEP   []  Progressed/Changed HEP based on: Other Objective/Functional Measures:    LOB on several occasions with march/  and HHT    Requires several rest periods due to LOB     Post Treatment Pain Level (on 0 to 10) scale:   0  / 10     ASSESSMENT  Assessment/Changes in Function:     Functional improvement:  Reports doing HEP over the wekend   Functional limitation:  Balance dysfunction      []  See Progress Note/Recertification   Patient will continue to benefit from skilled PT services to modify and progress therapeutic interventions, address functional mobility deficits, address strength deficits, address imbalance/dizziness and instruct in home and community integration to attain remaining goals.    Progress toward goals / Updated goals:    Slow progress     PLAN  [x]  Upgrade activities as tolerated YES Continue plan of care   []  Discharge due to :    []  Other:      Therapist: Kori Johnson PT    Date: 6/26/2017 Time: 11:40 AM     Future Appointments  Date Time Provider Steve Harrison   6/29/2017 12:30 PM Otilia Liao PT REHAB CENTER AT [facility]

## 2017-06-29 ENCOUNTER — HOSPITAL ENCOUNTER (OUTPATIENT)
Dept: PHYSICAL THERAPY | Age: 82
Discharge: HOME | End: 2017-06-29
Payer: MEDICARE

## 2017-06-29 PROCEDURE — 97112 NEUROMUSCULAR REEDUCATION: CPT

## 2017-06-29 PROCEDURE — 97110 THERAPEUTIC EXERCISES: CPT

## 2017-06-29 NOTE — PROGRESS NOTES
PHYSICAL THERAPY - DAILY TREATMENT NOTE    Patient Name: Lorraine Brown        Date: 2017  : 1933    Patient  Verified: YES  Visit #:   15      20  Insurance: Payor: Jose Miguel Thapa / Plan: [place] MEDICARE PART A & B / Product Type: Medicare /      In time: 12:25 Out time: 1:10   Total Treatment Time: 45     Medicare Time Tracking (below)   Total Timed Codes (min):  45 1:1 Treatment Time:  25     TREATMENT AREA/ DIAGNOSIS = Other abnormalities of gait and mobility [R26.89]  Ataxia [R27.0]    SUBJECTIVE  Pain Level (on 0 to 10 scale):  0  / 10   Medication Changes/New allergies or changes in medical history, any new surgeries or procedures? NO    If yes, update Summary List   Subjective Functional Status/Changes:  [x]  No changes reported     Functional improvement    Functional limitation       20 min Neuromuscular Re-ed:    Rationale:    improve coordination, improve balance and increase proprioception to improve the patients ability to ambulate safely    25 min Therapeutic Exercise:  [x]  See flow sheet   Rationale:      increase ROM and increase strength to improve the patients ability to ambulate safely          min Patient Education:  Yes    [x] Reviewed HEP   []  Progressed/Changed HEP based on: Other Objective/Functional Measures:    Pt wants to transition to a gym with a   Advised to do 2 more weeks to finalize HEP   Post Treatment Pain Level (on 0 to 10) scale:   0  / 10     ASSESSMENT  Assessment/Changes in Function:     Improved balance and strength     []  See Progress Note/Recertification   Patient will continue to benefit from skilled PT services to modify and progress therapeutic interventions, address functional mobility deficits, address ROM deficits, address strength deficits, analyze and address soft tissue restrictions, analyze and cue movement patterns and analyze and modify body mechanics/ergonomics to attain remaining goals.    Progress toward goals / Updated goals:    Improved balance and strength     PLAN  [x]  Upgrade activities as tolerated YES Continue plan of care   []  Discharge due to :    []  Other:      Therapist: Belle Silver PT    Date: 6/29/2017 Time: 1:51 PM        Future Appointments  Date Time Provider Steve Harrison   7/6/2017 9:30 AM Belle Silver PT REHAB CENTER AT [facility]   7/11/2017 11:30 AM Belle Silver PT REHAB CENTER AT [facility]   7/13/2017 12:00 PM Belle Silver PT REHAB CENTER AT [facility]   7/19/2017 12:00 PM Belle Silver PT REHAB CENTER AT [facility]

## 2017-07-06 ENCOUNTER — HOSPITAL ENCOUNTER (OUTPATIENT)
Dept: PHYSICAL THERAPY | Age: 82
Discharge: HOME | End: 2017-07-06
Payer: MEDICARE

## 2017-07-06 PROCEDURE — 97110 THERAPEUTIC EXERCISES: CPT

## 2017-07-06 PROCEDURE — 97112 NEUROMUSCULAR REEDUCATION: CPT

## 2017-07-06 NOTE — PROGRESS NOTES
PHYSICAL THERAPY - DAILY TREATMENT NOTE    Patient Name: Jay Bland        Date: 2017  : 1933    Patient  Verified: YES  Visit #:     Insurance: Payor: Maryann Staff / Plan: [place] MEDICARE PART A & B / Product Type: Medicare /      In time: 9:30 Out time: 10:20   Total Treatment Time: 50     Medicare Time Tracking (below)   Total Timed Codes (min):  50 1:1 Treatment Time:  40     TREATMENT AREA/ DIAGNOSIS = Other abnormalities of gait and mobility [R26.89]  Ataxia [R27.0]    SUBJECTIVE  Pain Level (on 0 to 10 scale):  0  / 10   Medication Changes/New allergies or changes in medical history, any new surgeries or procedures? NO    If yes, update Summary List   Subjective Functional Status/Changes:  [x]  No changes reported     Functional improvement    Functional limitation       30 min Neuromuscular Re-ed:    Rationale:    improve coordination, improve balance and increase proprioception to improve the patients ability to ambulate safely    20 min Therapeutic Exercise:  [x]  See flow sheet   Rationale:      increase ROM and increase strength to improve the patients ability to ambulate safely          min Patient Education:  Yes    [x] Reviewed HEP   []  Progressed/Changed HEP based on: Other Objective/Functional Measures:    Pt with no C/o pain     Post Treatment Pain Level (on 0 to 10) scale:   0  / 10     ASSESSMENT  Assessment/Changes in Function:     Pt with improving balance     []  See Progress Note/Recertification   Patient will continue to benefit from skilled PT services to modify and progress therapeutic interventions, address functional mobility deficits, address ROM deficits, address strength deficits, analyze and address soft tissue restrictions, analyze and cue movement patterns, analyze and modify body mechanics/ergonomics, assess and modify postural abnormalities and address imbalance/dizziness to attain remaining goals.    Progress toward goals / Updated goals:    Not needing any assitive device     PLAN  [x]  Upgrade activities as tolerated YES Continue plan of care   []  Discharge due to :    []  Other:      Therapist: Jignesh Madison PT    Date: 7/6/2017 Time: 9:55 AM        Future Appointments  Date Time Provider Steve Harrison   7/11/2017 11:30 AM Jignesh Payment, PT REHAB CENTER AT [facility]   7/13/2017 12:00 PM Jignehs Payment, PT REHAB CENTER AT [facility]   7/19/2017 12:00 PM Jignesh Payment, PT REHAB CENTER AT [facility]

## 2017-07-10 ENCOUNTER — HOSPITAL ENCOUNTER (OUTPATIENT)
Dept: PHYSICAL THERAPY | Age: 82
Discharge: HOME | End: 2017-07-10
Payer: MEDICARE

## 2017-07-10 PROCEDURE — 97112 NEUROMUSCULAR REEDUCATION: CPT

## 2017-07-10 NOTE — PROGRESS NOTES
PHYSICAL THERAPY - DAILY TREATMENT NOTE    Patient Name: Rafi Egan        Date: 7/10/2017  : 1933   YES Patient  Verified  Visit #:     Insurance: Payor: James Castro / Plan: [place] MEDICARE PART A & B / Product Type: Medicare /      In time: 1100 Out time: 1200   Total Treatment Time: 60     Medicare Time Tracking (below)   Total Timed Codes (min):  60 1:1 Treatment Time:  20     TREATMENT AREA =  Other abnormalities of gait and mobility [R26.89]  Ataxia [R27.0]    SUBJECTIVE  Pain Level (on 0 to 10 scale):  0  / 10   Medication Changes/New allergies or changes in medical history, any new surgeries or procedures? NO    If yes, update Summary List   Subjective Functional Status/Changes:  []  No changes reported     \"I think I'm getting better but I still get out of breath. \"          OBJECTIVE  Modalities Rationale:      min [] Estim, type/location:                                      []  att     []  unatt     []  w/US     []  w/ice    []  w/heat    min []  Mechanical Traction: type/lbs                   []  pro   []  sup   []  int   []  cont    []  before manual    []  after manual    min []  Ultrasound, settings/location:      min []  Iontophoresis w/ dexamethasone, location:                                               []  take home patch       []  in clinic    min []  Ice     []  Heat    location/position:     min []  Vasopneumatic Device, press/temp:     min []  Other:    [] Skin assessment post-treatment (if applicable):    []  intact    []  redness- no adverse reaction     []redness  adverse reaction:        5/40(NB) min Therapeutic Exercise:  [x]  See flow sheet   Rationale:      increase ROM and increase strength to improve the patients ability to ambulate without deviation, fall prevention      min Manual Therapy:         min Therapeutic Activity:        15/5(NB) min Neuromuscular Re-ed: See flow sheet   Rationale:    improve coordination, improve balance and increase proprioception to improve the patients ability to utilize ankle hip strategy with LOB     min Gait Training:    Rationale:       min Patient Education:  YES  Reviewed HEP   []  Progressed/Changed HEP based on: Other Objective/Functional Measures:         Post Treatment Pain Level (on 0 to 10) scale:   0  / 10     ASSESSMENT  Assessment/Changes in Function:     Difficulty with ankle hip strategy with eyes closed today. Required smaller visual field for ambulation with VHT/HHT. []  See Progress Note/Recertification   Patient will continue to benefit from skilled PT services to modify and progress therapeutic interventions, address functional mobility deficits, address ROM deficits, address strength deficits, analyze and address soft tissue restrictions, analyze and cue movement patterns, analyze and modify body mechanics/ergonomics, assess and modify postural abnormalities and address imbalance/dizziness to attain remaining goals.    Progress toward goals / Updated goals:    Patient is progressing towards LTG#3     PLAN  [x]  Upgrade activities as tolerated YES Continue plan of care   []  Discharge due to :    []  Other:      Therapist: Nalini Chicas PT    Date: 7/10/2017 Time: 12:02 PM     Future Appointments  Date Time Provider Steve Harrison   7/13/2017 12:00 PM Ben Powers PT REHAB CENTER AT [facility]   7/19/2017 12:00 PM Ben Powers PT REHAB CENTER AT [facility]

## 2017-07-11 ENCOUNTER — APPOINTMENT (OUTPATIENT)
Dept: PHYSICAL THERAPY | Age: 82
End: 2017-07-11
Payer: MEDICARE

## 2017-07-13 ENCOUNTER — HOSPITAL ENCOUNTER (OUTPATIENT)
Dept: PHYSICAL THERAPY | Age: 82
Discharge: HOME | End: 2017-07-13
Payer: MEDICARE

## 2017-07-13 PROCEDURE — 97112 NEUROMUSCULAR REEDUCATION: CPT

## 2017-07-13 PROCEDURE — 97110 THERAPEUTIC EXERCISES: CPT

## 2017-07-13 NOTE — PROGRESS NOTES
PHYSICAL THERAPY - DAILY TREATMENT NOTE    Patient Name: Anurag Meza        Date: 2017  : 1933    Patient  Verified: YES  Visit #:      of   20  Insurance: Payor: Aneesh Baca / Plan: [place] MEDICARE PART A & B / Product Type: Medicare /      In time: 12 Out time: 1   Total Treatment Time: 60     Medicare Time Tracking (below)   Total Timed Codes (min):  60 1:1 Treatment Time:  40     TREATMENT AREA/ DIAGNOSIS = Other abnormalities of gait and mobility [R26.89]  Ataxia [R27.0]    SUBJECTIVE  Pain Level (on 0 to 10 scale):  0  / 10   Medication Changes/New allergies or changes in medical history, any new surgeries or procedures? NO    If yes, update Summary List   Subjective Functional Status/Changes:  []  No changes reported     Functional improvement    Functional limitation           20 min Therapeutic Exercise:  [x]  See flow sheet   Rationale:      increase ROM and increase strength to improve the patients ability to ambulate safely     40 min Neuromuscular Re-ed:    Rationale:    improve balance and increase proprioception to improve the patients ability to ambulate safely       min Patient Education:  Yes    [x] Reviewed HEP   []  Progressed/Changed HEP based on:         Other Objective/Functional Measures:    Pt with no complaints today  Ambulating with st cane   Post Treatment Pain Level (on 0 to 10) scale:   0  / 10     ASSESSMENT  Assessment/Changes in Function:     Pt needed multiple breaks, may have been low blood sugar, he felt better after 2 chocolate candies   []  See Progress Note/Recertification   Patient will continue to benefit from skilled PT services to modify and progress therapeutic interventions, address functional mobility deficits, address ROM deficits, address strength deficits, analyze and address soft tissue restrictions, analyze and cue movement patterns, analyze and modify body mechanics/ergonomics and assess and modify postural abnormalities to attain remaining goals.   Progress toward goals / Updated goals:    Improved balance     PLAN  [x]  Upgrade activities as tolerated YES Continue plan of care   []  Discharge due to :    []  Other:      Therapist: Beau Ng PT    Date: 7/13/2017 Time: 12:07 PM        Future Appointments  Date Time Provider Steve Harrison   7/19/2017 12:00 PM Beau Ng PT REHAB CENTER AT [facility]

## 2017-07-19 ENCOUNTER — HOSPITAL ENCOUNTER (OUTPATIENT)
Dept: PHYSICAL THERAPY | Age: 82
Discharge: HOME | End: 2017-07-19
Payer: MEDICARE

## 2017-07-19 PROCEDURE — G8979 MOBILITY GOAL STATUS: HCPCS

## 2017-07-19 PROCEDURE — 97110 THERAPEUTIC EXERCISES: CPT

## 2017-07-19 PROCEDURE — G8980 MOBILITY D/C STATUS: HCPCS

## 2017-07-19 PROCEDURE — 97112 NEUROMUSCULAR REEDUCATION: CPT

## 2017-07-19 PROCEDURE — 97530 THERAPEUTIC ACTIVITIES: CPT

## 2017-07-19 NOTE — PROGRESS NOTES
PHYSICAL THERAPY - DAILY TREATMENT NOTE    Patient Name: Felix Shelton        Date: 2017  : 1933    Patient  Verified: YES  Visit #:     Insurance: Payor: Jordon Rowe / Plan: [place] MEDICARE PART A & B / Product Type: Medicare /      In time: 12 Out time: 12:45   Total Treatment Time: 45     Medicare Time Tracking (below)   Total Timed Codes (min):  45 1:1 Treatment Time:  40     TREATMENT AREA/ DIAGNOSIS = Other abnormalities of gait and mobility [R26.89]  Ataxia [R27.0]    SUBJECTIVE  Pain Level (on 0 to 10 scale):  0  / 10   Medication Changes/New allergies or changes in medical history, any new surgeries or procedures? NO    If yes, update Summary List   Subjective Functional Status/Changes:  [x]  No changes reported     Functional improvement    Functional limitation       20 min Neuromuscular Re-ed:    Rationale:    improve coordination, improve balance and increase proprioception to improve the patients ability to ambulate safely    10 min Therapeutic Activity: Spencer Balance test and reassessment     15 min Therapeutic Exercise:  [x]  See flow sheet   Rationale:      increase ROM and increase strength to improve the patients ability to ambulate safely         min Patient Education:  Yes    [x] Reviewed HEP   []  Progressed/Changed HEP based on: Other Objective/Functional Measures:    See DC   Post Treatment Pain Level (on 0 to 10) scale:   0  / 10     ASSESSMENT  Assessment/Changes in Function:     See DC     []  See Progress Note/Recertification   Patient will continue to benefit from skilled PT services to instruct in home and community integration to attain remaining goals.    Progress toward goals / Updated goals:    See DC     PLAN  []  Upgrade activities as tolerated YES Continue plan of care   [x]  Discharge due to : Maximal progress made   []  Other:      Therapist: Nancy Ruiz PT    Date: 2017 Time: 11:57 AM        Future Appointments  Date Time Provider Steve Harrison   7/19/2017 12:00 PM Alexa Freitas, PT REHAB CENTER AT [facility]

## 2017-07-19 NOTE — PROGRESS NOTES
[address]  [facility] AT [facility]. 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 1. Increase Spencer Balance score to56/56, to indicate decreased risk of falls    Status at last Eval: 49/56 (was 43/56) Current Status: 48/56 progressing   2. Able to  Rhomberg position (feet together) for > 30 seconds with his eyes closed to indicate improved balance    Status at last Eval: < 3 seconds Current Status: < 7 sec L and R progressing   3. Increase LE strength to allow recipricol gait on stairs without handrails   Status at last Eval: recipricol gait on stairs with need of handrails Current Status: Able to negotiate stairs with recipricol gait without rails, but needing CS progressing     Key Functional Changes/Progress: independent with HEP. Pt is going to do post rehab program.     G-Codes (GP): Mobility:  [id number] Goal  CH= 0%  D/C  CI= 1-19%. The severity rating is based on the Other Spencer Balance Score  Assessments/Recommendations: DC PT with pt transitioning with post rehab program.  If you have any questions/comments please contact us directly at [phone]. Thank you for allowing us to assist in the care of your patient. Therapist Signature:  Iliana Fu PT, MDT Date:    Certification Period:  Reporting Period: 17-17-17 Time: 11:58 AM

## 2022-06-21 PROBLEM — G25.81 RESTLESS LEG SYNDROME: Status: ACTIVE | Noted: 2022-06-21

## 2022-06-21 PROBLEM — I10 ESSENTIAL HYPERTENSION: Status: ACTIVE | Noted: 2022-06-21

## 2022-06-21 PROBLEM — G47.30 SLEEP APNEA: Status: ACTIVE | Noted: 2022-06-21

## 2022-06-21 PROBLEM — E78.00 HIGH BLOOD CHOLESTEROL: Status: ACTIVE | Noted: 2022-06-21

## 2022-06-21 PROBLEM — I25.10 CORONARY ARTERY DISEASE INVOLVING NATIVE CORONARY ARTERY OF NATIVE HEART WITHOUT ANGINA PECTORIS: Status: ACTIVE | Noted: 2022-06-21

## 2022-06-21 PROBLEM — I82.409 DVT (DEEP VENOUS THROMBOSIS) (HCC): Status: ACTIVE | Noted: 2022-06-21

## 2022-06-21 PROBLEM — K21.9 GASTROESOPHAGEAL REFLUX DISEASE WITHOUT ESOPHAGITIS: Status: ACTIVE | Noted: 2022-06-21

## 2022-06-21 PROBLEM — J44.1 ACUTE EXACERBATION OF CHRONIC OBSTRUCTIVE PULMONARY DISEASE (HCC): Status: ACTIVE | Noted: 2017-11-07

## 2022-06-21 PROBLEM — N52.9 ED (ERECTILE DYSFUNCTION): Status: ACTIVE | Noted: 2022-06-21

## 2022-06-21 PROBLEM — K57.90 DIVERTICULOSIS: Status: ACTIVE | Noted: 2022-06-21
